# Patient Record
Sex: FEMALE | Race: WHITE | NOT HISPANIC OR LATINO | Employment: OTHER | URBAN - METROPOLITAN AREA
[De-identification: names, ages, dates, MRNs, and addresses within clinical notes are randomized per-mention and may not be internally consistent; named-entity substitution may affect disease eponyms.]

---

## 2023-10-14 PROBLEM — K22.70 BARRETT'S ESOPHAGUS WITH ESOPHAGITIS: Status: ACTIVE | Noted: 2023-10-14

## 2023-10-14 PROBLEM — H69.91 DYSFUNCTION OF RIGHT EUSTACHIAN TUBE: Status: ACTIVE | Noted: 2023-10-14

## 2023-10-14 PROBLEM — K92.9 DIGESTIVE SYSTEM DISORDER: Status: ACTIVE | Noted: 2023-10-14

## 2023-10-14 PROBLEM — H93.8X9 PRESSURE SENSATION IN EAR: Status: ACTIVE | Noted: 2023-10-14

## 2023-10-14 PROBLEM — H90.A31 MIXED CONDUCTIVE AND SENSORINEURAL HEARING LOSS, UNILATERAL, RIGHT EAR WITH RESTRICTED HEARING ON THE CONTRALATERAL SIDE: Status: ACTIVE | Noted: 2023-10-14

## 2023-10-14 PROBLEM — H93.13 TINNITUS OF BOTH EARS: Status: ACTIVE | Noted: 2023-10-14

## 2023-10-14 PROBLEM — K20.90 BARRETT'S ESOPHAGUS WITH ESOPHAGITIS: Status: ACTIVE | Noted: 2023-10-14

## 2023-10-14 PROBLEM — H72.91 PERFORATION OF RIGHT TYMPANIC MEMBRANE: Status: ACTIVE | Noted: 2023-10-14

## 2023-10-14 PROBLEM — H90.A22 SENSORINEURAL HEARING LOSS (SNHL) OF LEFT EAR WITH RESTRICTED HEARING OF RIGHT EAR: Status: ACTIVE | Noted: 2023-10-14

## 2023-10-14 PROBLEM — H71.91 CHOLESTEATOMA OF RIGHT EAR: Status: ACTIVE | Noted: 2023-10-14

## 2023-10-14 PROBLEM — I10 HYPERTENSIVE DISORDER: Status: ACTIVE | Noted: 2023-10-14

## 2023-10-14 RX ORDER — FAMCICLOVIR 500 MG/1
1 TABLET ORAL EVERY 12 HOURS
COMMUNITY
Start: 2020-07-08

## 2023-10-17 ENCOUNTER — CLINICAL SUPPORT (OUTPATIENT)
Dept: AUDIOLOGY | Facility: CLINIC | Age: 73
End: 2023-10-17

## 2023-10-17 NOTE — PROGRESS NOTES
HEARING AID FITTING    RIGHT: PHONAK AUDEO L90 RT RECHARGEABLE ERIN WITH A #0S  AND CAP DOME  S.N.: [id number]  LEFT:  PHONAK AUDEO L90 RT RECHARGEABLE ERIN WITH A #0S  AND CAP DOME  S.N.: [id number]  WARRANTY EXPIRES: 12/25/2026    FIT THE PATIENT WITH THE ABOVE LISTED HEARING AIDS SET %.  DISCUSSED USE AND CARE OF THE HEARING AIDS AND PRACTICED INSERTING THE AIDS.  CREATED A PUBLIC TELECOIL PROGRAM FOR THE PATIENT TO ACCESS WHEN SHE GOES TO THE THEATER AND CONNECTS TO THE LOOP SYSTEM.  INCREASED THE SOFT NOISE REDUCTION TO 9 IN ALL PROGRAMS EXCEPT MUSIC.  THE PATIENT IS NOT INTERESTED IN USING THE [employer] BETITO.  SHE WOULD LIKE ME TO ORDER HER A REMOTE CONTROL SO SHE CAN ADJUST THE VOLUME AND PROGRAM CHANGE EASIER.  SHE MAY CONSIDER STREAMING PHONE CALLS.  IN ADDITION TO TODAY'S VERBAL INSTRUCTION OF THE HEARING DEVICES, THE PATIENT WAS GIVEN WRITTEN INSTRUCTIONS FROM THE HEARING AID .  HEARING AID LIMITATIONS AND REALISTIC EXPECTATIONS WERE ADDRESSED.  THE PATIENT WAS ADVISED IN ORDER TO RECEIVE FULL BENEFIT OF AMPLIFICATION, CONSISTENT USE DURING ALL WAKING HOURS IS RECOMMENDED.  THE REPAIR WARRANTY AND THE CONDITIONS OF THE RIGHT-TO-RETURN PERIOD WERE DISCUSSED.  THE PATIENT REPORTS UNDERSTANDING OF THESE CONDITIONS.  PURCHASE AGREEMENT WAS SIGNED (SEE SCANNED DOCUMENTS).  FOLLOW UP IN ONE WEEK.    APPOINTMENT TIME:  9:30-10:30

## 2023-10-24 ENCOUNTER — CLINICAL SUPPORT (OUTPATIENT)
Dept: AUDIOLOGY | Facility: CLINIC | Age: 73
End: 2023-10-24
Payer: MEDICARE

## 2023-10-24 DIAGNOSIS — H90.3 ASYMMETRICAL SENSORINEURAL HEARING LOSS: Primary | ICD-10-CM

## 2023-10-24 PROCEDURE — HRANC PR HEARING AID NO CHARGE: Performed by: AUDIOLOGIST

## 2023-10-24 NOTE — PROGRESS NOTES
HEARING AID CHECK     RIGHT: PHONAK AUDEO L90 RT RECHARGEABLE ERIN WITH A #0S  AND CAP DOME  S.N.: [id number]  LEFT:  PHONAK AUDEO L90 RT RECHARGEABLE ERIN WITH A #0S  AND CAP DOME  S.N.: [id number]  WARRANTY EXPIRES: 12/25/2026     The patient is doing well with her hearing aids.  Reviewed insertion of the aids and reminded her to make sure the  wires are flush with her cheeks.  Showed the patient how to replace the wax guards and had her return demonstrate.  I took off the cover of the [facility] for her, and she wrote notes for herself.  She was able to return demonstrate correctly, but she may want us to replace the wax guards for her.  Paired the patient's remote control to her hearing aids and demonstrated how to make adjustments.  She was pleased with the remote control.  Follow up in three weeks for speechmapping.  I will give her extra domes at that appointment.       APPOINTMENT TIME:  9:00-9:30

## 2023-11-20 ENCOUNTER — CLINICAL SUPPORT (OUTPATIENT)
Dept: AUDIOLOGY | Facility: CLINIC | Age: 73
End: 2023-11-20
Payer: MEDICARE

## 2023-11-20 DIAGNOSIS — H90.3 SENSORINEURAL HEARING LOSS (SNHL) OF BOTH EARS: Primary | ICD-10-CM

## 2023-11-20 PROCEDURE — HRANC PR HEARING AID NO CHARGE: Performed by: AUDIOLOGIST

## 2023-11-20 NOTE — PROGRESS NOTES
HEARING AID CHECK     RIGHT: PHONAK AUDEO L90 RT RECHARGEABLE ERIN WITH A #0S  AND CAP DOME  S.N.: [id number]  LEFT:  PHONAK AUDEO L90 RT RECHARGEABLE ERIN WITH A #0S  AND CAP DOME  S.N.: [id number]  WARRANTY EXPIRES: 12/25/2026     The patient is doing well with her hearing aids and she really likes her remote control.  Reviewed how to replace the wax guards and took the disk lid off the cerushields.  When we get the receivers with cerustops I will call her and we will transition to that type of wax guard.  Gave the patient extra domes.  Performed speechmapping and increased the gain from 3773-6899 Hz x3 in the left ear and from 5765-7909 Hz x3 in the right ear.  Also ran the current update in the software.  The patient reported that she could hear well after today's adjustments.  Recall at the end of the first year.      APPOINTMENT TIME:  9:30-10:00

## 2024-01-08 ENCOUNTER — CLINICAL SUPPORT (OUTPATIENT)
Dept: AUDIOLOGY | Facility: CLINIC | Age: 74
End: 2024-01-08
Payer: MEDICARE

## 2024-01-08 DIAGNOSIS — H90.3 ASYMMETRICAL SENSORINEURAL HEARING LOSS: Primary | ICD-10-CM

## 2024-01-08 PROCEDURE — HRANC PR HEARING AID NO CHARGE: Performed by: AUDIOLOGIST

## 2024-01-08 NOTE — PROGRESS NOTES
HEARING AID CHECK     RIGHT: DAVID DENNIS L90 RT RECHARGEABLE ERIN WITH A #0S  AND CAP DOME  S.N.: [id number]  LEFT:  DAVID DENNIS L90 RT RECHARGEABLE ERIN WITH A #0S  AND CAP DOME  S.N.: [id number]  WARRANTY EXPIRES: 12/25/2026  CERUSTOP WAX GUARDS    Replaced the patient's receivers with receivers that takes a cerustop wax guards.  Showed the patient how to replace the wax guards and had her return demonstrate.  She was very pleased at how much easier it will be to replace her wax guards at home.  The patient reported that her telecoil works very well when she goes to the theater.  She does sometimes get an electrical interference when she walks into her guest room with the emergency call button.  David recommended connecting the hearing aids and deleting all pairings to clear the cashe in the hearing aids.  Wrote the patient instructions so she can re pair her hearing aids to her remote control when she gets home.  She will hold down the plus ch while she turns on the remote after removing the hearing aids from their .  A blue blinking light will turn green once the remote and hearing aids are paired.  N/C     APPOINTMENT TIME:  1:30-2:00

## 2024-01-18 ENCOUNTER — CLINICAL SUPPORT (OUTPATIENT)
Dept: AUDIOLOGY | Facility: CLINIC | Age: 74
End: 2024-01-18
Payer: MEDICARE

## 2024-01-18 DIAGNOSIS — H90.3 ASYMMETRICAL SENSORINEURAL HEARING LOSS: Primary | ICD-10-CM

## 2024-01-18 PROCEDURE — HRANC PR HEARING AID NO CHARGE: Performed by: AUDIOLOGIST

## 2024-01-18 NOTE — PROGRESS NOTES
HEARING AID CHECK     RIGHT: PHONAK AUDEO L90 RT RECHARGEABLE ERIN WITH A #0S  AND CAP DOME  S.N.: [id number]  LEFT:  PHONAK AUDEO L90 RT RECHARGEABLE ERIN WITH A #0S  AND CAP DOME  S.N.: [id number]  WARRANTY EXPIRES: 12/25/2026  CERUSTOP WAX GUARDS     The patient came in today because her remote control is not working with her hearing aids.  Re paired the remote to her hearing aids and it began to function properly.  To pair the remote control to the hearing aids you need to hold down the plus ch while turning on the remote after removing the hearing aids from their .  A blue blinking light will turn green once the remote and hearing aids are paired.  N/C     APPOINTMENT TIME:  9:30-9:45

## 2024-02-06 ENCOUNTER — CLINICAL SUPPORT (OUTPATIENT)
Dept: AUDIOLOGY | Facility: CLINIC | Age: 74
End: 2024-02-06
Payer: MEDICARE

## 2024-02-06 ENCOUNTER — OFFICE VISIT (OUTPATIENT)
Dept: OTOLARYNGOLOGY | Facility: CLINIC | Age: 74
End: 2024-02-06
Payer: MEDICARE

## 2024-02-06 VITALS — WEIGHT: 124 LBS | HEIGHT: 63 IN | BODY MASS INDEX: 21.97 KG/M2

## 2024-02-06 DIAGNOSIS — H71.91 CHOLESTEATOMA OF RIGHT EAR: Primary | ICD-10-CM

## 2024-02-06 DIAGNOSIS — H90.A31 MIXED CONDUCTIVE AND SENSORINEURAL HEARING LOSS, UNILATERAL, RIGHT EAR WITH RESTRICTED HEARING ON THE CONTRALATERAL SIDE: ICD-10-CM

## 2024-02-06 DIAGNOSIS — H90.3 ASYMMETRICAL SENSORINEURAL HEARING LOSS: Primary | ICD-10-CM

## 2024-02-06 PROCEDURE — 99213 OFFICE O/P EST LOW 20 MIN: CPT | Performed by: OTOLARYNGOLOGY

## 2024-02-06 PROCEDURE — 1126F AMNT PAIN NOTED NONE PRSNT: CPT | Performed by: OTOLARYNGOLOGY

## 2024-02-06 PROCEDURE — HRANC PR HEARING AID NO CHARGE: Performed by: AUDIOLOGIST

## 2024-02-06 PROCEDURE — 1157F ADVNC CARE PLAN IN RCRD: CPT | Performed by: OTOLARYNGOLOGY

## 2024-02-06 PROCEDURE — 1036F TOBACCO NON-USER: CPT | Performed by: OTOLARYNGOLOGY

## 2024-02-06 PROCEDURE — 1159F MED LIST DOCD IN RCRD: CPT | Performed by: OTOLARYNGOLOGY

## 2024-02-06 RX ORDER — AMLODIPINE BESYLATE 5 MG/1
5 TABLET ORAL DAILY
COMMUNITY

## 2024-02-06 ASSESSMENT — ENCOUNTER SYMPTOMS
DEPRESSION: 0
OCCASIONAL FEELINGS OF UNSTEADINESS: 0
LOSS OF SENSATION IN FEET: 0

## 2024-02-06 ASSESSMENT — PAIN SCALES - GENERAL: PAINLEVEL: 0-NO PAIN

## 2024-02-06 ASSESSMENT — PATIENT HEALTH QUESTIONNAIRE - PHQ9
1. LITTLE INTEREST OR PLEASURE IN DOING THINGS: NOT AT ALL
2. FEELING DOWN, DEPRESSED OR HOPELESS: NOT AT ALL
SUM OF ALL RESPONSES TO PHQ9 QUESTIONS 1 AND 2: 0

## 2024-02-06 NOTE — LETTER
February 6, 2024     Chilo Burns MD  [address]  [address]  [facility] [address]    Patient: Chyna Hillman   YOB: 1950   Date of Visit: 2/6/2024       Dear Dr. Chilo Burns MD:    Thank you for referring Chyna Hillman to me for evaluation. Below are my notes for this consultation.  If you have questions, please do not hesitate to call me. I look forward to following your patient along with you.       Sincerely,     Ford Perry MD      CC: No Recipients  ______________________________________________________________________________________    Chief Complaint   Patient presents with   • Follow-up     FUV - 6 MONTH FOLLOW UP       Date of Evaluation: 2/6/2024   HPI  She returns in follow-up for right chronic ear.  Hearing aids are working well.  She is very pleased with her postoperative results.  No pain or drainage.  Chyna Hillman is a 73 y.o. female  status post right tympanoplasty with mastoidectomy with ossicular chain reconstruction (Dornhoffer PORP with titanium cradle) covered with tragal cartilage graft on December 15, 2022. Her audiogram shows a small 10 to 15 dB air-bone gap in the mid to high frequencies but otherwise a good result. She has bilateral mid to high-frequency sensorineural hearing loss. No drainage, pain, changes to her tinnitus.  History:  seen in consultation at the request of Dr. Burns for possible cholesteatoma. She complains of right ear pressure and hearing loss. She has a mixed hearing loss on the right. CT scan of the temporal bones shows soft tissue density within the right middle ear space. She denies any history of ear problems prior to March 2022. At that time she was having a pressure sensation in the right ear and had her ear cleaned. This was painful and then got infected. She was treated with eardrops and now has been found to have a right middle ear cholesteatoma. Her audiogram in June showed bilateral mid to high-frequency  "sensorineural hearing loss with a conduct of hearing loss in the mid to high frequencies in the right ear.        Past Medical History:   Diagnosis Date   • Personal history of other diseases of the circulatory system     History of hypertension      Past Surgical History:   Procedure Laterality Date   • OTHER SURGICAL HISTORY  05/16/2022    No history of surgery          Medications:   Current Outpatient Medications   Medication Instructions   • amLODIPine (NORVASC) 5 mg, oral, Daily   • famciclovir (Famvir) 500 mg tablet 1 tablet, Every 12 hours        Allergies:  No Known Allergies     Physical Exam:  Last Recorded Vitals  Height 1.6 m (5' 3\"), weight 56.2 kg (124 lb).  []General appearance: Well-developed, well-nourished in no acute distress, conversant with normal voice quality    Head/face: No erythema or edema or facial tenderness, and normal facial nerve function bilaterally    External ear: Clear external auditory canals with normal pinnae bilateral cerumen impactions removed tube status: N/A  Middle ear: Left tympanic membranes intact and mobile, middle ears normal.  Right posterior cartilage graft in good position.  No retraction.  Tympanic membrane perforation: N/A  Mastoid bowl: N/A  Hearing: Normal conversational awareness at normal speech thresholds    Nose visualized using: Anterior rhinoscopy  Nasal dorsum: Nontraumatic midline appearance  Septum: Midline, nonobstructing  Inferior turbinates: Normal, pink  Secretions: Dry    Oral cavity and oropharynx: Normal  Teeth: Good condition  Floor of mouth: without lesions  Palate: Normal hard palate, soft palate and uvula  Oropharynx: Clear, no lesions present  Buccal mucosa: Normal without masses or lesions  Lips: Normal    Nasopharynx: Inadequate mirror exam secondary to gag/anatomy    Neck:  Salivary glands: Normal bilateral parotid and submandibular glands by inspection and palpation.  Non-thyroid masses: No palpable masses or significant " lymphadenopathy  Trachea: Midline  Thyroid: No thyromegaly or palpable nodules  Temporomandibular joint: Nontender  Cervical range of motion: Normal    Neurologic exam: Alert and oriented x3, appropriate affect.  Cranial nerves II-XII normal bilaterally  Extraocular movement: Extraocular movement intact, normal gaze alignment        Chyna was seen today for follow-up.  Diagnoses and all orders for this visit:  Cholesteatoma of right ear (Primary)  Mixed conductive and sensorineural hearing loss, unilateral, right ear with restricted hearing on the contralateral side       PLAN  Doing well.  Continue with hearing aids.  Follow-up in 6 months    Ford Perry MD

## 2024-02-06 NOTE — PROGRESS NOTES
HEARING AID CHECK     RIGHT: PHONAK AUDEO L90 RT RECHARGEABLE ERIN WITH A #0S  AND CAP DOME  S.N.: [id number]  LEFT:  PHONAK AUDEO L90 RT RECHARGEABLE ERIN WITH A #0S  AND CAP DOME  S.N.: [id number]  WARRANTY EXPIRES: 12/25/2026  CERUSTOP WAX GUARDS     The patient reported that occasionally when she enters a store she gets a very loud buzzing in her hearing aids and has to take them out.  It doesn't happen all of the time, but when it does it is very bothersome.  It appears to be interference from the patient's telecoil interacting with the magnetic field from the store.  Contacted [facility] audiology, and they recommended increasing noise block in all programs including telecoil.  The patient will try this and hopefully it will help.  N/C     APPOINTMENT TIME:  10:00-10:20

## 2024-02-06 NOTE — PROGRESS NOTES
Chief Complaint   Patient presents with    Follow-up     FUV - 6 MONTH FOLLOW UP       Date of Evaluation: 2/6/2024   HPI  She returns in follow-up for right chronic ear.  Hearing aids are working well.  She is very pleased with her postoperative results.  No pain or drainage.  Chyna Hillman is a 73 y.o. female  status post right tympanoplasty with mastoidectomy with ossicular chain reconstruction (Dornhoffer PORP with titanium cradle) covered with tragal cartilage graft on December 15, 2022. Her audiogram shows a small 10 to 15 dB air-bone gap in the mid to high frequencies but otherwise a good result. She has bilateral mid to high-frequency sensorineural hearing loss. No drainage, pain, changes to her tinnitus.  History:  seen in consultation at the request of Dr. Burns for possible cholesteatoma. She complains of right ear pressure and hearing loss. She has a mixed hearing loss on the right. CT scan of the temporal bones shows soft tissue density within the right middle ear space. She denies any history of ear problems prior to March 2022. At that time she was having a pressure sensation in the right ear and had her ear cleaned. This was painful and then got infected. She was treated with eardrops and now has been found to have a right middle ear cholesteatoma. Her audiogram in June showed bilateral mid to high-frequency sensorineural hearing loss with a conduct of hearing loss in the mid to high frequencies in the right ear.        Past Medical History:   Diagnosis Date    Personal history of other diseases of the circulatory system     History of hypertension      Past Surgical History:   Procedure Laterality Date    OTHER SURGICAL HISTORY  05/16/2022    No history of surgery          Medications:   Current Outpatient Medications   Medication Instructions    amLODIPine (NORVASC) 5 mg, oral, Daily    famciclovir (Famvir) 500 mg tablet 1 tablet, Every 12 hours        Allergies:  No Known Allergies  "    Physical Exam:  Last Recorded Vitals  Height 1.6 m (5' 3\"), weight 56.2 kg (124 lb).  []General appearance: Well-developed, well-nourished in no acute distress, conversant with normal voice quality    Head/face: No erythema or edema or facial tenderness, and normal facial nerve function bilaterally    External ear: Clear external auditory canals with normal pinnae bilateral cerumen impactions removed tube status: N/A  Middle ear: Left tympanic membranes intact and mobile, middle ears normal.  Right posterior cartilage graft in good position.  No retraction.  Tympanic membrane perforation: N/A  Mastoid bowl: N/A  Hearing: Normal conversational awareness at normal speech thresholds    Nose visualized using: Anterior rhinoscopy  Nasal dorsum: Nontraumatic midline appearance  Septum: Midline, nonobstructing  Inferior turbinates: Normal, pink  Secretions: Dry    Oral cavity and oropharynx: Normal  Teeth: Good condition  Floor of mouth: without lesions  Palate: Normal hard palate, soft palate and uvula  Oropharynx: Clear, no lesions present  Buccal mucosa: Normal without masses or lesions  Lips: Normal    Nasopharynx: Inadequate mirror exam secondary to gag/anatomy    Neck:  Salivary glands: Normal bilateral parotid and submandibular glands by inspection and palpation.  Non-thyroid masses: No palpable masses or significant lymphadenopathy  Trachea: Midline  Thyroid: No thyromegaly or palpable nodules  Temporomandibular joint: Nontender  Cervical range of motion: Normal    Neurologic exam: Alert and oriented x3, appropriate affect.  Cranial nerves II-XII normal bilaterally  Extraocular movement: Extraocular movement intact, normal gaze alignment        Chyna was seen today for follow-up.  Diagnoses and all orders for this visit:  Cholesteatoma of right ear (Primary)  Mixed conductive and sensorineural hearing loss, unilateral, right ear with restricted hearing on the contralateral side       PLAN  Doing well.  Continue " with hearing aids.  Follow-up in 6 months    Ford Perry MD

## 2024-03-04 ENCOUNTER — CLINICAL SUPPORT (OUTPATIENT)
Dept: AUDIOLOGY | Facility: CLINIC | Age: 74
End: 2024-03-04
Payer: MEDICARE

## 2024-03-04 DIAGNOSIS — H90.3 ASYMMETRICAL SENSORINEURAL HEARING LOSS: Primary | ICD-10-CM

## 2024-03-04 PROCEDURE — HRANC PR HEARING AID NO CHARGE: Performed by: AUDIOLOGIST

## 2024-03-04 NOTE — PROGRESS NOTES
HEARING AID CHECK     RIGHT: DAVID HAMILTONO L90 RT RECHARGEABLE ERIN WITH A #0S  AND CAP DOME  S.N.: [id number]  LEFT:  PHONAK AUDEO L90 RT RECHARGEABLE ERIN WITH A #0S  AND CAP DOME  S.N.: [id number]  WARRANTY EXPIRES: 12/25/2026  CERUSTOP WAX GUARDS     The patient originally scheduled this appointment because she was hearing a crackling sound when she went over speed bumps, but that has ceased.  She is still getting interference in the form of a loud buzz when she walks into some stores.  Phonak recommended reducing G50 in the tcoil program x2, and increasing soft noise reduction in the tcoil and calm situations program.  The patient finds the telecoil very useful, and she will just take the hearing aids out if she gets interference.  Follow up in one year or sooner if needed.  N/C     APPOINTMENT TIME:  4:15-4:45

## 2024-09-10 ENCOUNTER — APPOINTMENT (OUTPATIENT)
Dept: OTOLARYNGOLOGY | Facility: CLINIC | Age: 74
End: 2024-09-10
Payer: MEDICARE

## 2024-09-10 VITALS — HEIGHT: 63 IN | WEIGHT: 124 LBS | BODY MASS INDEX: 21.97 KG/M2

## 2024-09-10 DIAGNOSIS — H71.91 CHOLESTEATOMA OF RIGHT EAR: Primary | ICD-10-CM

## 2024-09-10 DIAGNOSIS — H90.A31 MIXED CONDUCTIVE AND SENSORINEURAL HEARING LOSS, UNILATERAL, RIGHT EAR WITH RESTRICTED HEARING ON THE CONTRALATERAL SIDE: ICD-10-CM

## 2024-09-10 PROCEDURE — 1159F MED LIST DOCD IN RCRD: CPT | Performed by: OTOLARYNGOLOGY

## 2024-09-10 PROCEDURE — 1157F ADVNC CARE PLAN IN RCRD: CPT | Performed by: OTOLARYNGOLOGY

## 2024-09-10 PROCEDURE — 99213 OFFICE O/P EST LOW 20 MIN: CPT | Performed by: OTOLARYNGOLOGY

## 2024-09-10 NOTE — PROGRESS NOTES
Chief Complaint   Patient presents with    Follow-up     LOV: 2/2024 RT EAR CHOLESTEATOMA       Date of Evaluation: 9/10/2024   HPI  She returns in follow-up for right chronic ear.  Hearing aids are working well.  She is very pleased with her postoperative results.  No pain or drainage.  Chyna Hillman is a 74 y.o. female  status post right tympanoplasty with mastoidectomy with ossicular chain reconstruction (Dornhoffer PORP with titanium cradle) covered with tragal cartilage graft on December 15, 2022. Her audiogram shows a small 10 to 15 dB air-bone gap in the mid to high frequencies but otherwise a good result. She has bilateral mid to high-frequency sensorineural hearing loss. No drainage, pain, changes to her tinnitus.  History:  seen in consultation at the request of Dr. Burns for possible cholesteatoma. She complains of right ear pressure and hearing loss. She has a mixed hearing loss on the right. CT scan of the temporal bones shows soft tissue density within the right middle ear space. She denies any history of ear problems prior to March 2022. At that time she was having a pressure sensation in the right ear and had her ear cleaned. This was painful and then got infected. She was treated with eardrops and now has been found to have a right middle ear cholesteatoma. Her audiogram in June showed bilateral mid to high-frequency sensorineural hearing loss with a conduct of hearing loss in the mid to high frequencies in the right ear.        Past Medical History:   Diagnosis Date    Personal history of other diseases of the circulatory system     History of hypertension      Past Surgical History:   Procedure Laterality Date    OTHER SURGICAL HISTORY  05/16/2022    No history of surgery          Medications:   Current Outpatient Medications   Medication Instructions    amLODIPine (NORVASC) 5 mg, oral, Daily    famciclovir (Famvir) 500 mg tablet 1 tablet, Every 12 hours        Allergies:  No Known  "Allergies     Physical Exam:  Last Recorded Vitals  Height 1.6 m (5' 3\"), weight 56.2 kg (124 lb).  []General appearance: Well-developed, well-nourished in no acute distress, conversant with normal voice quality    Head/face: No erythema or edema or facial tenderness, and normal facial nerve function bilaterally    External ear: Clear external auditory canals with normal pinnae bilateral cerumen impactions removed tube status: N/A  Middle ear: Left tympanic membranes intact and mobile, middle ears normal.  Right posterior cartilage graft in good position.  No retraction.  Tympanic membrane perforation: N/A  Mastoid bowl: N/A  Hearing: Normal conversational awareness at normal speech thresholds    Nose visualized using: Anterior rhinoscopy  Nasal dorsum: Nontraumatic midline appearance  Septum: Midline, nonobstructing  Inferior turbinates: Normal, pink  Secretions: Dry    Oral cavity and oropharynx: Normal  Teeth: Good condition  Floor of mouth: without lesions  Palate: Normal hard palate, soft palate and uvula  Oropharynx: Clear, no lesions present  Buccal mucosa: Normal without masses or lesions  Lips: Normal    Nasopharynx: Inadequate mirror exam secondary to gag/anatomy    Neck:  Salivary glands: Normal bilateral parotid and submandibular glands by inspection and palpation.  Non-thyroid masses: No palpable masses or significant lymphadenopathy  Trachea: Midline  Thyroid: No thyromegaly or palpable nodules  Temporomandibular joint: Nontender  Cervical range of motion: Normal    Neurologic exam: Alert and oriented x3, appropriate affect.  Cranial nerves II-XII normal bilaterally  Extraocular movement: Extraocular movement intact, normal gaze alignment        Chyna was seen today for follow-up.  Diagnoses and all orders for this visit:  Cholesteatoma of right ear (Primary)  Mixed conductive and sensorineural hearing loss, unilateral, right ear with restricted hearing on the contralateral side         PLAN  Doing " well.  Continue with hearing aids.  Follow-up in 12 months    Ford Perry MD

## 2024-09-17 ENCOUNTER — OFFICE VISIT (OUTPATIENT)
Dept: OTOLARYNGOLOGY | Facility: CLINIC | Age: 74
End: 2024-09-17
Payer: MEDICARE

## 2024-09-17 VITALS — WEIGHT: 124 LBS | BODY MASS INDEX: 21.97 KG/M2 | HEIGHT: 63 IN

## 2024-09-17 DIAGNOSIS — T16.1XXA FOREIGN BODY OF RIGHT EAR, INITIAL ENCOUNTER: ICD-10-CM

## 2024-09-17 DIAGNOSIS — H71.91 CHOLESTEATOMA OF RIGHT EAR: Primary | ICD-10-CM

## 2024-09-17 PROCEDURE — 1157F ADVNC CARE PLAN IN RCRD: CPT | Performed by: OTOLARYNGOLOGY

## 2024-09-17 PROCEDURE — 99213 OFFICE O/P EST LOW 20 MIN: CPT | Performed by: OTOLARYNGOLOGY

## 2024-09-17 PROCEDURE — 1159F MED LIST DOCD IN RCRD: CPT | Performed by: OTOLARYNGOLOGY

## 2024-09-17 PROCEDURE — 1036F TOBACCO NON-USER: CPT | Performed by: OTOLARYNGOLOGY

## 2024-09-17 NOTE — PROGRESS NOTES
Chief Complaint   Patient presents with    Foreign Body in Ear     EP- RT EAR HEARING AID PIECE, JUST SEEN 1 WEEK AGO      Date of Evaluation: 9/17/2024   HPI  She lost a hearing aid dome in the right ear  She has a right chronic ear.  Hearing aids are working well.  She is very pleased with her postoperative results.  No pain or drainage.  Chyna Hillman is a 74 y.o. female  status post right tympanoplasty with mastoidectomy with ossicular chain reconstruction (Dornhoffer PORP with titanium cradle) covered with tragal cartilage graft on December 15, 2022. Her audiogram shows a small 10 to 15 dB air-bone gap in the mid to high frequencies but otherwise a good result. She has bilateral mid to high-frequency sensorineural hearing loss. No drainage, pain, changes to her tinnitus.  History:  seen in consultation at the request of Dr. Burns for possible cholesteatoma. She complains of right ear pressure and hearing loss. She has a mixed hearing loss on the right. CT scan of the temporal bones shows soft tissue density within the right middle ear space. She denies any history of ear problems prior to March 2022. At that time she was having a pressure sensation in the right ear and had her ear cleaned. This was painful and then got infected. She was treated with eardrops and now has been found to have a right middle ear cholesteatoma. Her audiogram in June showed bilateral mid to high-frequency sensorineural hearing loss with a conduct of hearing loss in the mid to high frequencies in the right ear.        Past Medical History:   Diagnosis Date    Personal history of other diseases of the circulatory system     History of hypertension      Past Surgical History:   Procedure Laterality Date    OTHER SURGICAL HISTORY  05/16/2022    No history of surgery          Medications:   Current Outpatient Medications   Medication Instructions    amLODIPine (NORVASC) 5 mg, oral, Daily    famciclovir (Famvir) 500 mg tablet 1  "tablet, Every 12 hours        Allergies:  No Known Allergies     Physical Exam:  Last Recorded Vitals  Height 1.6 m (5' 3\"), weight 56.2 kg (124 lb).  []General appearance: Well-developed, well-nourished in no acute distress, conversant with normal voice quality    Head/face: No erythema or edema or facial tenderness, and normal facial nerve function bilaterally    External ear: Clear external auditory canals with normal pinnae bilateral cerumen impactions removed, hearing aid dome removed from the right ear canal  tube status: N/A  Middle ear: Left tympanic membranes intact and mobile, middle ears normal.  Right posterior cartilage graft in good position.  No retraction.  Tympanic membrane perforation: N/A  Mastoid bowl: N/A  Hearing: Normal conversational awareness at normal speech thresholds    Nose visualized using: Anterior rhinoscopy  Nasal dorsum: Nontraumatic midline appearance  Septum: Midline, nonobstructing  Inferior turbinates: Normal, pink  Secretions: Dry    Oral cavity and oropharynx: Normal  Teeth: Good condition  Floor of mouth: without lesions  Palate: Normal hard palate, soft palate and uvula  Oropharynx: Clear, no lesions present  Buccal mucosa: Normal without masses or lesions  Lips: Normal    Nasopharynx: Inadequate mirror exam secondary to gag/anatomy    Neck:  Salivary glands: Normal bilateral parotid and submandibular glands by inspection and palpation.  Non-thyroid masses: No palpable masses or significant lymphadenopathy  Trachea: Midline  Thyroid: No thyromegaly or palpable nodules  Temporomandibular joint: Nontender  Cervical range of motion: Normal    Neurologic exam: Alert and oriented x3, appropriate affect.  Cranial nerves II-XII normal bilaterally  Extraocular movement: Extraocular movement intact, normal gaze alignment        Chyna was seen today for foreign body in ear.  Diagnoses and all orders for this visit:  Cholesteatoma of right ear (Primary)  Foreign body of right ear, " initial encounter           PLAN  Hearing aid dome removed.  Continue with hearing aids.  Follow-up in 12 months    Ford Perry MD

## 2024-10-01 ENCOUNTER — OFFICE VISIT (OUTPATIENT)
Dept: OTOLARYNGOLOGY | Facility: CLINIC | Age: 74
End: 2024-10-01
Payer: MEDICARE

## 2024-10-01 ENCOUNTER — APPOINTMENT (OUTPATIENT)
Dept: AUDIOLOGY | Facility: CLINIC | Age: 74
End: 2024-10-01
Payer: MEDICARE

## 2024-10-01 VITALS — BODY MASS INDEX: 21.97 KG/M2 | WEIGHT: 124 LBS | HEIGHT: 63 IN

## 2024-10-01 DIAGNOSIS — H90.A31 MIXED CONDUCTIVE AND SENSORINEURAL HEARING LOSS OF RIGHT EAR WITH RESTRICTED HEARING OF LEFT EAR: Primary | ICD-10-CM

## 2024-10-01 DIAGNOSIS — H90.A22 SENSORINEURAL HEARING LOSS (SNHL) OF LEFT EAR WITH RESTRICTED HEARING OF RIGHT EAR: ICD-10-CM

## 2024-10-01 DIAGNOSIS — H71.91 CHOLESTEATOMA OF RIGHT EAR: Primary | ICD-10-CM

## 2024-10-01 DIAGNOSIS — H90.A31 MIXED CONDUCTIVE AND SENSORINEURAL HEARING LOSS, UNILATERAL, RIGHT EAR WITH RESTRICTED HEARING ON THE CONTRALATERAL SIDE: ICD-10-CM

## 2024-10-01 DIAGNOSIS — H93.11 TINNITUS OF RIGHT EAR: ICD-10-CM

## 2024-10-01 PROCEDURE — 92557 COMPREHENSIVE HEARING TEST: CPT | Performed by: AUDIOLOGIST

## 2024-10-01 PROCEDURE — 1036F TOBACCO NON-USER: CPT | Performed by: OTOLARYNGOLOGY

## 2024-10-01 PROCEDURE — 92567 TYMPANOMETRY: CPT | Performed by: AUDIOLOGIST

## 2024-10-01 PROCEDURE — 1157F ADVNC CARE PLAN IN RCRD: CPT | Performed by: OTOLARYNGOLOGY

## 2024-10-01 PROCEDURE — 1159F MED LIST DOCD IN RCRD: CPT | Performed by: OTOLARYNGOLOGY

## 2024-10-01 PROCEDURE — 99214 OFFICE O/P EST MOD 30 MIN: CPT | Performed by: OTOLARYNGOLOGY

## 2024-10-01 NOTE — PROGRESS NOTES
Chief Complaint   Patient presents with    Hearing Problem     LOV: 9/2024 HEARING CK, HAD AUDIO DONE      Date of Evaluation: 10/1/2024   HPI  She is complaining of decreased hearing in the right ear for an unknown period of time.  Her audiogram shows a significant change in her hearing.  There appears to be a significant sensorineural loss component with a drop in word discrimination to 76%        Chyna Hillman is a 74 y.o. female  status post right tympanoplasty with mastoidectomy with ossicular chain reconstruction (Dornhoffer PORP with titanium cradle) covered with tragal cartilage graft on December 15, 2022. Her audiogram shows a small 10 to 15 dB air-bone gap in the mid to high frequencies but otherwise a good result. She has bilateral mid to high-frequency sensorineural hearing loss. No drainage, pain, changes to her tinnitus.  History:  seen in consultation at the request of Dr. Burns for possible cholesteatoma. She complains of right ear pressure and hearing loss. She has a mixed hearing loss on the right. CT scan of the temporal bones shows soft tissue density within the right middle ear space. She denies any history of ear problems prior to March 2022. At that time she was having a pressure sensation in the right ear and had her ear cleaned. This was painful and then got infected. She was treated with eardrops and now has been found to have a right middle ear cholesteatoma. Her audiogram in June showed bilateral mid to high-frequency sensorineural hearing loss with a conduct of hearing loss in the mid to high frequencies in the right ear.        Past Medical History:   Diagnosis Date    Personal history of other diseases of the circulatory system     History of hypertension      Past Surgical History:   Procedure Laterality Date    OTHER SURGICAL HISTORY  05/16/2022    No history of surgery          Medications:   Current Outpatient Medications   Medication Instructions    amLODIPine (NORVASC) 5  "mg, oral, Daily    famciclovir (Famvir) 500 mg tablet 1 tablet, Every 12 hours        Allergies:  No Known Allergies     Physical Exam:  Last Recorded Vitals  Height 1.6 m (5' 3\"), weight 56.2 kg (124 lb).  []General appearance: Well-developed, well-nourished in no acute distress, conversant with normal voice quality    Head/face: No erythema or edema or facial tenderness, and normal facial nerve function bilaterally    External ear: Clear external auditory canals with normal pinnae bilateral cerumen impactions removed, hearing aid dome removed from the right ear canal  tube status: N/A  Middle ear: Left tympanic membranes intact and mobile, middle ears normal.  Right posterior cartilage graft in good position.  No retraction.  Tympanic membrane perforation: N/A  Mastoid bowl: N/A  Hearing: Normal conversational awareness at normal speech thresholds    Nose visualized using: Anterior rhinoscopy  Nasal dorsum: Nontraumatic midline appearance  Septum: Midline, nonobstructing  Inferior turbinates: Normal, pink  Secretions: Dry    Oral cavity and oropharynx: Normal  Teeth: Good condition  Floor of mouth: without lesions  Palate: Normal hard palate, soft palate and uvula  Oropharynx: Clear, no lesions present  Buccal mucosa: Normal without masses or lesions  Lips: Normal    Nasopharynx: Inadequate mirror exam secondary to gag/anatomy    Neck:  Salivary glands: Normal bilateral parotid and submandibular glands by inspection and palpation.  Non-thyroid masses: No palpable masses or significant lymphadenopathy  Trachea: Midline  Thyroid: No thyromegaly or palpable nodules  Temporomandibular joint: Nontender  Cervical range of motion: Normal    Neurologic exam: Alert and oriented x3, appropriate affect.  Cranial nerves II-XII normal bilaterally  Extraocular movement: Extraocular movement intact, normal gaze alignment        Chyna was seen today for hearing problem.  Diagnoses and all orders for this visit:  Cholesteatoma of " right ear (Primary)  Mixed conductive and sensorineural hearing loss, unilateral, right ear with restricted hearing on the contralateral side             PLAN  She has had a significant drop in her right-sided hearing.  There is a small conductive component but predominantly a low-frequency sensorineural hearing loss with a drop in her word recognition.  I would like to check a CT scan of the internal auditory canal/temporal bone to make sure there is no recurrence of cholesteatoma.  We will talk after her CAT scan and she will proceed with hearing aid adjustments    Ford Perry MD

## 2024-10-01 NOTE — PROGRESS NOTES
AUDIOLOGY ADULT AUDIOMETRIC EVALUATION    Name:  Chyna Hillman  :  1950  Age:  74 y.o.  Date of Evaluation:  2024    Reason for visit: Ms. Hillman is seen in the clinic today at the request of Ford Perry MD in otolaryngology for an audiologic evaluation.     HISTORY  The patient has a history of right-sided cholesteatoma, and had a right tympanoplasty with ossicular chain reconstruction in 2022.  She currently wears binaural hearing aids.  She has always had tinnitus in her right ear, but she stated that lately it has gotten worse.  She feels her hearing is worse in her right ear as well.       EVALUATION  See scanned audiogram: “Media” > “Audiology Report”.      RESULTS  Otoscopic Evaluation:  Right Ear: clear ear canal  Left Ear: clear ear canal    Immittance Measures:  Tympanometry:  Right Ear: Type A, normal tympanic membrane mobility with normal middle ear pressure   Left Ear: Type A, normal tympanic membrane mobility with normal middle ear pressure     Acoustic Reflexes:  Ipsilateral Right Ear: Could not evaluate since an adequate seal could not be maintained    Ipsilateral Left Ear: Could not evaluate since an adequate seal could not be maintained    Contralateral Right Ear: did not evaluate  Contralateral Left Ear: did not evaluate    Distortion Product Otoacoustic Emissions (DPOAEs):  Right Ear: did not evaluate   Left Ear: did not evaluate     Audiometry:  Test Technique and Reliability:   Standard audiometry via supra-aural headphones. Reliability is good.    Pure tone air and bone conduction audiometry:  Right Ear: moderately-severe to moderate hearing loss at 250-1000 Hz, rising to normal hearing at 8152-2086 Hz, sloping to a moderate to severe hearing loss at 4379-2391 Hz.  The hearing loss is mixed in nature.    Left Ear: mild to moderately-severe sensorineural hearing loss at 5221-0782 Hz    Speech Audiometry (Word Recognition Scores):   Right Ear: Fair, 76%  in quiet at an elevated presentation level   Left Ear: Excellent, 100%     IMPRESSIONS  Results of today's audiometric evaluation revealed a high frequency sensorineural hearing loss in the left ear and a mixed hearing loss in the right ear.  The hearing decreased significantly in the right ear compared to her previous audiogram from September 2023.      RECOMMENDATIONS  - Follow up with otolaryngology today as scheduled.  Appropriate follow up for decrease in hearing in the right ear.  - Audiologic evaluation in conjunction with otologic care, if an acute change is noted, and/or annually.  - Continued hearing aid use.  Follow up with a hearing aid check to reprogram right hearing aid.  - Follow-up with audiology annually for routine hearing aid maintenance, sooner if questions/problems arise.  - Follow-up with medical care team as planned.    PATIENT EDUCATION  Discussed results, impressions and recommendations with the patient. Questions were addressed and the patient was encouraged to contact our office should concerns arise.    Time for this encounter: 10:30-11:30    Shanice Tran M.A., CCC-A   Licensed Audiologist

## 2024-10-07 ENCOUNTER — APPOINTMENT (OUTPATIENT)
Dept: AUDIOLOGY | Facility: CLINIC | Age: 74
End: 2024-10-07

## 2024-10-07 DIAGNOSIS — H90.3 ASYMMETRICAL SENSORINEURAL HEARING LOSS: Primary | ICD-10-CM

## 2024-10-07 PROCEDURE — HRANC PR HEARING AID NO CHARGE: Performed by: AUDIOLOGIST

## 2024-10-07 NOTE — PROGRESS NOTES
HEARING AID CHECK     RIGHT: PHONAK AUDEO L90 RT RECHARGEABLE ERIN WITH A #0S  AND CAP DOME  S.N.: [id number]  LEFT:  PHONAK AUDEO L90 RT RECHARGEABLE ERIN WITH A #0S  AND CAP DOME  S.N.: [id number]  WARRANTY EXPIRES: 12/25/2026  CERUSTOP WAX GUARDS     The patient was seen for a hearing evaluation and Dr. Perry visit on October 1, 2024.  She has always experienced tinnitus in her right ear, but recently she began noticing a decrease in hearing and increase in tinnitus in that ear.  She stated that her voice echos in her head and she is very sensitive to sound in that ear.  She is often taking out her right hearing aid because the sound is uncomfortable.  Results of that hearing evaluation did indicate a significant decrease in the hearing in the patient's right ear.  Today I was going to reprogram her right hearing aid to her current audiogram.  Since she is already uncomfortable with sound in that ear, making her hearing aid louder would not make sense.  She is having a CT scan on October 25, 2024.  We will wait to see the results and meet again after that.       APPOINTMENT TIME:  9:30-10:00

## 2024-10-09 ENCOUNTER — TELEPHONE (OUTPATIENT)
Dept: OTOLARYNGOLOGY | Facility: CLINIC | Age: 74
End: 2024-10-09
Payer: MEDICARE

## 2024-10-09 NOTE — TELEPHONE ENCOUNTER
Pt was last seen 10/01/24. Is scheduled for ct on 10/24/24.  Pt called and said that 2 hours ago her right ear is now getting a loud rushing wind sound, no pain or drainage.  She said it is very loud.

## 2024-10-24 ENCOUNTER — LAB (OUTPATIENT)
Dept: LAB | Facility: LAB | Age: 74
End: 2024-10-24
Payer: MEDICARE

## 2024-10-24 ENCOUNTER — HOSPITAL ENCOUNTER (OUTPATIENT)
Dept: RADIOLOGY | Facility: HOSPITAL | Age: 74
Discharge: HOME | End: 2024-10-24
Payer: MEDICARE

## 2024-10-24 DIAGNOSIS — H71.91 CHOLESTEATOMA OF RIGHT EAR: ICD-10-CM

## 2024-10-24 LAB
CREAT SERPL-MCNC: 0.82 MG/DL (ref 0.5–1.05)
EGFRCR SERPLBLD CKD-EPI 2021: 75 ML/MIN/1.73M*2

## 2024-10-24 PROCEDURE — 36415 COLL VENOUS BLD VENIPUNCTURE: CPT

## 2024-10-24 PROCEDURE — 70482 CT ORBIT/EAR/FOSSA W/O&W/DYE: CPT

## 2024-10-24 PROCEDURE — 70481 CT ORBIT/EAR/FOSSA W/DYE: CPT

## 2024-10-24 PROCEDURE — 2550000001 HC RX 255 CONTRASTS: Performed by: OTOLARYNGOLOGY

## 2024-10-24 PROCEDURE — 82565 ASSAY OF CREATININE: CPT

## 2024-10-25 NOTE — RESULT ENCOUNTER NOTE
Please call the patient regarding her CT scan of the temporal bones looks good.  No evidence of recurrent cholesteatoma.  Her hearing loss is likely at the nerve level.  We need to keep monitoring this and using the hearing aids.  I would like her to follow-up with me in the office

## 2024-11-19 ENCOUNTER — APPOINTMENT (OUTPATIENT)
Dept: OTOLARYNGOLOGY | Facility: CLINIC | Age: 74
End: 2024-11-19
Payer: MEDICARE

## 2024-11-19 VITALS — BODY MASS INDEX: 21.97 KG/M2 | HEIGHT: 63 IN | WEIGHT: 124 LBS

## 2024-11-19 DIAGNOSIS — H71.91 CHOLESTEATOMA OF RIGHT EAR: Primary | ICD-10-CM

## 2024-11-19 DIAGNOSIS — H90.A31 MIXED CONDUCTIVE AND SENSORINEURAL HEARING LOSS, UNILATERAL, RIGHT EAR WITH RESTRICTED HEARING ON THE CONTRALATERAL SIDE: ICD-10-CM

## 2024-11-19 DIAGNOSIS — H90.3 ASYMMETRIC SENSORINEURAL DEAFNESS: ICD-10-CM

## 2024-11-19 DIAGNOSIS — H90.3 ASYMMETRICAL SENSORINEURAL HEARING LOSS: ICD-10-CM

## 2024-11-19 PROCEDURE — 99214 OFFICE O/P EST MOD 30 MIN: CPT | Performed by: OTOLARYNGOLOGY

## 2024-11-19 PROCEDURE — 1159F MED LIST DOCD IN RCRD: CPT | Performed by: OTOLARYNGOLOGY

## 2024-11-19 PROCEDURE — 1157F ADVNC CARE PLAN IN RCRD: CPT | Performed by: OTOLARYNGOLOGY

## 2024-11-19 PROCEDURE — 1036F TOBACCO NON-USER: CPT | Performed by: OTOLARYNGOLOGY

## 2024-11-19 RX ORDER — PREDNISONE 20 MG/1
TABLET ORAL
Qty: 30 TABLET | Refills: 0 | Status: SHIPPED | OUTPATIENT
Start: 2024-11-19

## 2024-11-19 NOTE — PROGRESS NOTES
Chief Complaint   Patient presents with    Follow-up     LOV: 10/2024 HEARING CHECK, HAD CT DONE      Date of Evaluation: 11/19/2024   HPI  She returns in follow-up for asymmetric hearing loss and history of cholesteatoma.  CT scan of the temporal bone good without any evidence of recurrent cholesteatoma.  She is still having difficulty hearing on the right.  She is complaining of decreased hearing in the right ear for an unknown period of time.  Her audiogram shows a significant change in her hearing.  There appears to be a significant sensorineural loss component with a drop in word discrimination to 76%        Chyna Hillman is a 74 y.o. female  status post right tympanoplasty with mastoidectomy with ossicular chain reconstruction (Dornhoffer PORP with titanium cradle) covered with tragal cartilage graft on December 15, 2022. Her audiogram shows a small 10 to 15 dB air-bone gap in the mid to high frequencies but otherwise a good result. She has bilateral mid to high-frequency sensorineural hearing loss. No drainage, pain, changes to her tinnitus.  History:  seen in consultation at the request of Dr. Burns for possible cholesteatoma. She complains of right ear pressure and hearing loss. She has a mixed hearing loss on the right. CT scan of the temporal bones shows soft tissue density within the right middle ear space. She denies any history of ear problems prior to March 2022. At that time she was having a pressure sensation in the right ear and had her ear cleaned. This was painful and then got infected. She was treated with eardrops and now has been found to have a right middle ear cholesteatoma. Her audiogram in June showed bilateral mid to high-frequency sensorineural hearing loss with a conduct of hearing loss in the mid to high frequencies in the right ear.        Past Medical History:   Diagnosis Date    Personal history of other diseases of the circulatory system     History of hypertension      Past  "Surgical History:   Procedure Laterality Date    OTHER SURGICAL HISTORY  05/16/2022    No history of surgery          Medications:   Current Outpatient Medications   Medication Instructions    amLODIPine (NORVASC) 5 mg, Daily    famciclovir (Famvir) 500 mg tablet 1 tablet, Every 12 hours    predniSONE (Deltasone) 20 mg tablet Take 3 PO daily x 5 days, then 2 PO daily x 5 days, then 1 PO daily x 5 days        Allergies:  No Known Allergies     Physical Exam:  Last Recorded Vitals  Height 1.6 m (5' 3\"), weight 56.2 kg (124 lb).  []General appearance: Well-developed, well-nourished in no acute distress, conversant with normal voice quality    Head/face: No erythema or edema or facial tenderness, and normal facial nerve function bilaterally    External ear: Clear external auditory canals with normal pinnae bilateral cerumen impactions removed, hearing aid dome removed from the right ear canal  tube status: N/A  Middle ear: Left tympanic membranes intact and mobile, middle ears normal.  Right posterior cartilage graft in good position.  No retraction.  Tympanic membrane perforation: N/A  Mastoid bowl: N/A  Hearing: Normal conversational awareness at normal speech thresholds    Nose visualized using: Anterior rhinoscopy  Nasal dorsum: Nontraumatic midline appearance  Septum: Midline, nonobstructing  Inferior turbinates: Normal, pink  Secretions: Dry    Oral cavity and oropharynx: Normal  Teeth: Good condition  Floor of mouth: without lesions  Palate: Normal hard palate, soft palate and uvula  Oropharynx: Clear, no lesions present  Buccal mucosa: Normal without masses or lesions  Lips: Normal    Nasopharynx: Inadequate mirror exam secondary to gag/anatomy    Neck:  Salivary glands: Normal bilateral parotid and submandibular glands by inspection and palpation.  Non-thyroid masses: No palpable masses or significant lymphadenopathy  Trachea: Midline  Thyroid: No thyromegaly or palpable nodules  Temporomandibular joint: " Nontender  Cervical range of motion: Normal    Neurologic exam: Alert and oriented x3, appropriate affect.  Cranial nerves II-XII normal bilaterally  Extraocular movement: Extraocular movement intact, normal gaze alignment        Chyna was seen today for follow-up.  Diagnoses and all orders for this visit:  Cholesteatoma of right ear (Primary)  Mixed conductive and sensorineural hearing loss, unilateral, right ear with restricted hearing on the contralateral side  Asymmetric sensorineural deafness  Asymmetrical sensorineural hearing loss  -     predniSONE (Deltasone) 20 mg tablet; Take 3 PO daily x 5 days, then 2 PO daily x 5 days, then 1 PO daily x 5 days               PLAN  I reviewed her CT scanning which shows no cholesteatoma.  She does have an asymmetric sensorineural hearing loss and I would like to trial a 2-week course of a high-dose prednisone.  I discussed the pros and cons of this and she is willing to give it a try.  I will see her back in 2 weeks with an audiogram    Ford Perry MD

## 2024-12-02 ENCOUNTER — CLINICAL SUPPORT (OUTPATIENT)
Dept: AUDIOLOGY | Facility: CLINIC | Age: 74
End: 2024-12-02

## 2024-12-02 ENCOUNTER — APPOINTMENT (OUTPATIENT)
Dept: AUDIOLOGY | Facility: CLINIC | Age: 74
End: 2024-12-02
Payer: MEDICARE

## 2024-12-02 ENCOUNTER — APPOINTMENT (OUTPATIENT)
Dept: OTOLARYNGOLOGY | Facility: CLINIC | Age: 74
End: 2024-12-02
Payer: MEDICARE

## 2024-12-02 VITALS — BODY MASS INDEX: 22.32 KG/M2 | TEMPERATURE: 97.1 F | HEIGHT: 63 IN | WEIGHT: 126 LBS

## 2024-12-02 DIAGNOSIS — H90.A31 MIXED CONDUCTIVE AND SENSORINEURAL HEARING LOSS OF RIGHT EAR WITH RESTRICTED HEARING OF LEFT EAR: Primary | ICD-10-CM

## 2024-12-02 DIAGNOSIS — H71.91 CHOLESTEATOMA OF RIGHT EAR: Primary | ICD-10-CM

## 2024-12-02 DIAGNOSIS — H90.3 ASYMMETRICAL SENSORINEURAL HEARING LOSS: ICD-10-CM

## 2024-12-02 DIAGNOSIS — H90.A22 SENSORINEURAL HEARING LOSS (SNHL) OF LEFT EAR WITH RESTRICTED HEARING OF RIGHT EAR: ICD-10-CM

## 2024-12-02 DIAGNOSIS — H93.13 TINNITUS OF BOTH EARS: ICD-10-CM

## 2024-12-02 DIAGNOSIS — H90.A31 MIXED CONDUCTIVE AND SENSORINEURAL HEARING LOSS, UNILATERAL, RIGHT EAR WITH RESTRICTED HEARING ON THE CONTRALATERAL SIDE: ICD-10-CM

## 2024-12-02 DIAGNOSIS — H91.20 SUDDEN-ONSET SENSORINEURAL HEARING LOSS: ICD-10-CM

## 2024-12-02 PROCEDURE — 92557 COMPREHENSIVE HEARING TEST: CPT | Performed by: AUDIOLOGIST

## 2024-12-02 PROCEDURE — 1157F ADVNC CARE PLAN IN RCRD: CPT | Performed by: OTOLARYNGOLOGY

## 2024-12-02 PROCEDURE — 99214 OFFICE O/P EST MOD 30 MIN: CPT | Performed by: OTOLARYNGOLOGY

## 2024-12-02 PROCEDURE — 1159F MED LIST DOCD IN RCRD: CPT | Performed by: OTOLARYNGOLOGY

## 2024-12-02 PROCEDURE — 1036F TOBACCO NON-USER: CPT | Performed by: OTOLARYNGOLOGY

## 2024-12-02 PROCEDURE — HRANC PR HEARING AID NO CHARGE: Performed by: AUDIOLOGIST

## 2024-12-02 PROCEDURE — 92550 TYMPANOMETRY & REFLEX THRESH: CPT | Performed by: AUDIOLOGIST

## 2024-12-02 NOTE — PROGRESS NOTES
HEARING AID CHECK     RIGHT: PHONAK AUDEO L90 RT RECHARGEABLE ERIN WITH A #0S  AND CAP DOME WITH TRIMMED RETENTION TAIL  S.N.: [id number]  LEFT:  PHONAK AUDEO L90 RT RECHARGEABLE ERIN WITH A #0S  AND CAP DOME WITH TRIMMED RETENTION TAIL  S.N.: [id number]  WARRANTY EXPIRES: 12/25/2026  CERUSTOP WAX GUARDS     The patient had come in in October for her annual hearing aid check.  At that time she was experiencing a sudden right-sided hearing loss.  Since then, her hearing seems to be mostly back to baseline.  She had a hearing evaluation in our [place] office this morning.  The hearing in her right ear improved significantly from the sudden hearing loss she experienced on October 1, 2024.  There is a slight progression of hearing loss in both ears compared to her hearing evaluation on September 5, 2023.  Put the current hearing results in the hearing aid software.  Cleaned and dehumidified both hearing aids and vacuumed the microphones.  Replaced the wax guards, retention tails and domes.  The post cleaning listening check revealed good sound quality in both hearing aids.  Connected the hearing aids to the [employer] software and recalculated the fitting to accommodate any change in hearing.  The patient reported that she could hear well after today's cleaning and adjustments.  Recall in one year.  The patient will schedule a hearing evaluation, Dr. Perry visit, and hearing aid check on the same day.  This is her last no charge visit.          APPOINTMENT TIME:  2:00-2:30

## 2024-12-02 NOTE — PROGRESS NOTES
AUDIOLOGY ADULT AUDIOMETRIC EVALUATION    Name:  Chyna Hillman  :  1950  Age:  74 y.o.  Date of Evaluation:  2024    Reason for visit: Ms. Hillman is seen in the clinic today at the request of otolaryngology for an audiologic evaluation.     HISTORY  Patient complains of  asymmetric hearing loss and tinnitus and no dizziness or fullness.  Patient had surgery about one year ago right ear and successfully wears aids.     EVALUATION  See scanned audiogram: “Media” > “Audiology Report”.      RESULTS  Otoscopic Evaluation:  Right Ear: clear ear canal  Left Ear: clear ear canal    Immittance Measures:  Tympanometry:  Right Ear:  Type As, reduced tympanic membrane mobility with normal middle ear pressure to Type B, reduced tympanic membrane mobility   Left Ear:  Type As, reduced tympanic membrane mobility with normal middle ear pressure     Acoustic Reflexes:  Ipsilateral Right Ear:   NR NR NR NR   Ipsilateral Left Ear:      100 100   Contralateral Right Ear: did not evaluate  Contralateral Left Ear: did not evaluate    Audiometry:  Test Technique and Reliability: BEHAVIORAL   Standard audiometry via supra-aural headphones. Reliability is good.    Pure tone air and bone conduction audiometry:  Right Ear: MOSTLY WITHIN NORMAL LIMITS TO 1500 HZ WITH A MILD MODERATE TO SEVERE MIXED LOSS 2-8 K HZ IMPROVED FROM LAST AUDIOGRAM 10-1-2024.  Left Ear: WITHIN NORMAL LIMITS TO 4 K KHZ WITH A MILD MODERATELY SEVERE DROP AT 6-8 K HZ STABLE FROM LAST TEST.     Speech Audiometry (Word Recognition Scores):   Right Ear:  92% GOOD IMPROVED   Left Ear:    100% EXCELLENT     IMPRESSIONS  IMPROVED RIGHT FROM 10-1-24 AND STABLE LEFT.     The presence of acoustic reflexes within normal intensity limits is consistent with normal middle ear and brainstem function, and suggests that auditory sensitivity is not significantly impaired. An elevated or absent acoustic reflex threshold is consistent with a middle ear  disorder, hearing loss in the stimulated ear, and/or interruption of neural innervation of the stapedius muscle. Present DPOAEs suggest normal/near normal cochlear outer hair cell function and are consistent with no greater than a mild hearing loss at those frequencies. Absent DPOAEs are consistent with abnormal cochlear outer hair cell function and some degree of hearing loss at those frequencies.    RECOMMENDATIONS  - Follow up with otolaryngology today as scheduled.  - Audiologic evaluation as needed.  - Annual audiologic evaluation, sooner if an acute change is noted.  - Audiologic evaluation in conjunction with otologic care, if an acute change is noted, and/or annually.  - Continued hearing aid use Patient made an appointment for reprogramming with Shanice Tran, her audiologist..  - Follow-up with audiology annually for routine hearing aid maintenance, sooner if questions/problems arise.  - Follow-up with medical care team as planned.    PATIENT EDUCATION  Discussed results, impressions and recommendations with the patient. Questions were addressed and the patient was encouraged to contact our office should concerns arise.    Time for this encounter: 35 minutes    Chung Hernandez  Licensed Audiologist

## 2024-12-02 NOTE — PROGRESS NOTES
Chief Complaint   Patient presents with    Follow-up     LOV 9/24 HEARING LOSS RT. EAR F/U AUDIO      Date of Evaluation: 12/2/2024   HPI  She returns in follow-up for asymmetric hearing loss and history of cholesteatoma.  She had an excellent response to steroid and her hearing has returned to baseline.   CT scan of the temporal bone good without any evidence of recurrent cholesteatoma.  She is still having difficulty hearing on the right.  She is complaining of decreased hearing in the right ear for an unknown period of time.  Her audiogram shows a significant change in her hearing.  There appears to be a significant sensorineural loss component with a drop in word discrimination to 76%          Chyna Hillman is a 74 y.o. female  status post right tympanoplasty with mastoidectomy with ossicular chain reconstruction (Dornhoffer PORP with titanium cradle) covered with tragal cartilage graft on December 15, 2022. Her audiogram shows a small 10 to 15 dB air-bone gap in the mid to high frequencies but otherwise a good result. She has bilateral mid to high-frequency sensorineural hearing loss. No drainage, pain, changes to her tinnitus.  History:  seen in consultation at the request of Dr. Burns for possible cholesteatoma. She complains of right ear pressure and hearing loss. She has a mixed hearing loss on the right. CT scan of the temporal bones shows soft tissue density within the right middle ear space. She denies any history of ear problems prior to March 2022. At that time she was having a pressure sensation in the right ear and had her ear cleaned. This was painful and then got infected. She was treated with eardrops and now has been found to have a right middle ear cholesteatoma. Her audiogram in June showed bilateral mid to high-frequency sensorineural hearing loss with a conduct of hearing loss in the mid to high frequencies in the right ear.        Past Medical History:   Diagnosis Date    Personal  "history of other diseases of the circulatory system     History of hypertension      Past Surgical History:   Procedure Laterality Date    OTHER SURGICAL HISTORY  05/16/2022    No history of surgery          Medications:   Current Outpatient Medications   Medication Instructions    amLODIPine (NORVASC) 5 mg, Daily    famciclovir (Famvir) 500 mg tablet 1 tablet, Every 12 hours    predniSONE (Deltasone) 20 mg tablet Take 3 PO daily x 5 days, then 2 PO daily x 5 days, then 1 PO daily x 5 days        Allergies:  No Known Allergies     Physical Exam:  Last Recorded Vitals  Temperature 36.2 °C (97.1 °F), height 1.6 m (5' 3\"), weight 57.2 kg (126 lb).  []General appearance: Well-developed, well-nourished in no acute distress, conversant with normal voice quality    Head/face: No erythema or edema or facial tenderness, and normal facial nerve function bilaterally    External ear: Clear external auditory canals with normal pinnae   tube status: N/A  Middle ear: Left tympanic membranes intact and mobile, middle ears normal.  Right posterior cartilage graft in good position.  No retraction.  Tympanic membrane perforation: N/A  Mastoid bowl: N/A  Hearing: Normal conversational awareness at normal speech thresholds    Nose visualized using: Anterior rhinoscopy  Nasal dorsum: Nontraumatic midline appearance  Septum: Midline, nonobstructing  Inferior turbinates: Normal, pink  Secretions: Dry    Oral cavity and oropharynx: Normal  Teeth: Good condition  Floor of mouth: without lesions  Palate: Normal hard palate, soft palate and uvula  Oropharynx: Clear, no lesions present  Buccal mucosa: Normal without masses or lesions  Lips: Normal    Nasopharynx: Inadequate mirror exam secondary to gag/anatomy    Neck:  Salivary glands: Normal bilateral parotid and submandibular glands by inspection and palpation.  Non-thyroid masses: No palpable masses or significant lymphadenopathy  Trachea: Midline  Thyroid: No thyromegaly or palpable " nodules  Temporomandibular joint: Nontender  Cervical range of motion: Normal    Neurologic exam: Alert and oriented x3, appropriate affect.  Cranial nerves II-XII normal bilaterally  Extraocular movement: Extraocular movement intact, normal gaze alignment        Chyna was seen today for follow-up.  Diagnoses and all orders for this visit:  Cholesteatoma of right ear (Primary)  Mixed conductive and sensorineural hearing loss, unilateral, right ear with restricted hearing on the contralateral side  Asymmetrical sensorineural hearing loss  Sudden-onset sensorineural hearing loss                 PLAN  She has had an excellent response to steroid.  She is back to her baseline.  Recheck in 1 year or sooner if she develops any loss again    Ford Perry MD

## 2024-12-03 ENCOUNTER — APPOINTMENT (OUTPATIENT)
Dept: AUDIOLOGY | Facility: CLINIC | Age: 74
End: 2024-12-03

## 2025-05-22 ENCOUNTER — OFFICE VISIT (OUTPATIENT)
Dept: OTOLARYNGOLOGY | Facility: CLINIC | Age: 75
End: 2025-05-22
Payer: MEDICARE

## 2025-05-22 VITALS — WEIGHT: 123 LBS | BODY MASS INDEX: 21.79 KG/M2 | HEIGHT: 63 IN

## 2025-05-22 DIAGNOSIS — H61.23 BILATERAL IMPACTED CERUMEN: ICD-10-CM

## 2025-05-22 DIAGNOSIS — H90.3 ASYMMETRICAL SENSORINEURAL HEARING LOSS: ICD-10-CM

## 2025-05-22 DIAGNOSIS — H91.20 SUDDEN-ONSET SENSORINEURAL HEARING LOSS: ICD-10-CM

## 2025-05-22 DIAGNOSIS — H90.A31 MIXED CONDUCTIVE AND SENSORINEURAL HEARING LOSS, UNILATERAL, RIGHT EAR WITH RESTRICTED HEARING ON THE CONTRALATERAL SIDE: ICD-10-CM

## 2025-05-22 DIAGNOSIS — H71.91 CHOLESTEATOMA OF RIGHT EAR: Primary | ICD-10-CM

## 2025-05-22 NOTE — PROGRESS NOTES
Chief Complaint   Patient presents with    Cerumen Impaction     LOV: 12/2024 EAR CLEANING RIGHT CHOLESTEATOMA      Date of Evaluation: 5/22/2025   HPI  She returns in follow-up for asymmetric hearing loss and history of cholesteatoma.  She needs her ears cleaned.  She had an excellent response to steroid and her hearing returned to baseline.   CT scan of the temporal bone good without any evidence of recurrent cholesteatoma.  She is still having difficulty hearing on the right.  She is complaining of decreased hearing in the right ear for an unknown period of time.  Her audiogram shows a significant change in her hearing.  There appears to be a significant sensorineural loss component with a drop in word discrimination to 76%          Chyna Hillman is a 75 y.o. female  status post right tympanoplasty with mastoidectomy with ossicular chain reconstruction (Dornhoffer PORP with titanium cradle) covered with tragal cartilage graft on December 15, 2022. Her audiogram shows a small 10 to 15 dB air-bone gap in the mid to high frequencies but otherwise a good result. She has bilateral mid to high-frequency sensorineural hearing loss. No drainage, pain, changes to her tinnitus.  History:  seen in consultation at the request of Dr. Burns for possible cholesteatoma. She complains of right ear pressure and hearing loss. She has a mixed hearing loss on the right. CT scan of the temporal bones shows soft tissue density within the right middle ear space. She denies any history of ear problems prior to March 2022. At that time she was having a pressure sensation in the right ear and had her ear cleaned. This was painful and then got infected. She was treated with eardrops and now has been found to have a right middle ear cholesteatoma. Her audiogram in June showed bilateral mid to high-frequency sensorineural hearing loss with a conduct of hearing loss in the mid to high frequencies in the right ear.        Past Medical  "History:   Diagnosis Date    Personal history of other diseases of the circulatory system     History of hypertension      Past Surgical History:   Procedure Laterality Date    OTHER SURGICAL HISTORY  05/16/2022    No history of surgery          Medications:   Current Outpatient Medications   Medication Instructions    amLODIPine (NORVASC) 5 mg, Daily    famciclovir (Famvir) 500 mg tablet 1 tablet, Every 12 hours    predniSONE (Deltasone) 20 mg tablet Take 3 PO daily x 5 days, then 2 PO daily x 5 days, then 1 PO daily x 5 days        Allergies:  No Known Allergies     Physical Exam:  Last Recorded Vitals  Height 1.6 m (5' 3\"), weight 55.8 kg (123 lb).  []General appearance: Well-developed, well-nourished in no acute distress, conversant with normal voice quality    Head/face: No erythema or edema or facial tenderness, and normal facial nerve function bilaterally    External ear: Clear external auditory canals with normal pinnae   tube status: N/A  Middle ear: Left tympanic membranes intact and mobile, middle ears normal.  Right posterior cartilage graft in good position.  No retraction.  Tympanic membrane perforation: N/A  Mastoid bowl: N/A  Hearing: Normal conversational awareness at normal speech thresholds    Nose visualized using: Anterior rhinoscopy  Nasal dorsum: Nontraumatic midline appearance  Septum: Midline, nonobstructing  Inferior turbinates: Normal, pink  Secretions: Dry    Oral cavity and oropharynx: Normal  Teeth: Good condition  Floor of mouth: without lesions  Palate: Normal hard palate, soft palate and uvula  Oropharynx: Clear, no lesions present  Buccal mucosa: Normal without masses or lesions  Lips: Normal    Nasopharynx: Inadequate mirror exam secondary to gag/anatomy    Neck:  Salivary glands: Normal bilateral parotid and submandibular glands by inspection and palpation.  Non-thyroid masses: No palpable masses or significant lymphadenopathy  Trachea: Midline  Thyroid: No thyromegaly or palpable " nodules  Temporomandibular joint: Nontender  Cervical range of motion: Normal    Neurologic exam: Alert and oriented x3, appropriate affect.  Cranial nerves II-XII normal bilaterally  Extraocular movement: Extraocular movement intact, normal gaze alignment        Chyna was seen today for cerumen impaction.  Diagnoses and all orders for this visit:  Cholesteatoma of right ear (Primary)  Mixed conductive and sensorineural hearing loss, unilateral, right ear with restricted hearing on the contralateral side  Asymmetrical sensorineural hearing loss  Sudden-onset sensorineural hearing loss  Bilateral impacted cerumen                   PLAN  Steroid responsive sudden sensorineural hearing loss in the past with her hearing  to her baseline.  Recurrent cerumen impactions removed.  Hearing better now.  Recheck in 6 months    Ford Perry MD

## 2025-05-23 ENCOUNTER — APPOINTMENT (OUTPATIENT)
Dept: OTOLARYNGOLOGY | Facility: CLINIC | Age: 75
End: 2025-05-23
Payer: MEDICARE